# Patient Record
Sex: FEMALE | Race: WHITE | NOT HISPANIC OR LATINO | Employment: FULL TIME | URBAN - METROPOLITAN AREA
[De-identification: names, ages, dates, MRNs, and addresses within clinical notes are randomized per-mention and may not be internally consistent; named-entity substitution may affect disease eponyms.]

---

## 2020-09-04 ENCOUNTER — APPOINTMENT (OUTPATIENT)
Dept: LAB | Facility: CLINIC | Age: 45
End: 2020-09-04

## 2020-09-04 ENCOUNTER — OCCMED (OUTPATIENT)
Dept: URGENT CARE | Facility: CLINIC | Age: 45
End: 2020-09-04

## 2020-09-04 DIAGNOSIS — Z02.1 PHYSICAL EXAM, PRE-EMPLOYMENT: Primary | ICD-10-CM

## 2020-09-04 PROCEDURE — 36415 COLL VENOUS BLD VENIPUNCTURE: CPT

## 2020-09-04 PROCEDURE — 86480 TB TEST CELL IMMUN MEASURE: CPT

## 2020-09-08 LAB
GAMMA INTERFERON BACKGROUND BLD IA-ACNC: 0.06 IU/ML
M TB IFN-G BLD-IMP: NEGATIVE
M TB IFN-G CD4+ BCKGRND COR BLD-ACNC: 0.15 IU/ML
M TB IFN-G CD4+ BCKGRND COR BLD-ACNC: 0.31 IU/ML
MITOGEN IGNF BCKGRD COR BLD-ACNC: >10 IU/ML

## 2021-07-06 ENCOUNTER — HOSPITAL ENCOUNTER (EMERGENCY)
Facility: HOSPITAL | Age: 46
Discharge: HOME | End: 2021-07-06
Attending: EMERGENCY MEDICINE
Payer: OTHER MISCELLANEOUS

## 2021-07-06 ENCOUNTER — APPOINTMENT (EMERGENCY)
Dept: RADIOLOGY | Facility: HOSPITAL | Age: 46
End: 2021-07-06
Payer: OTHER MISCELLANEOUS

## 2021-07-06 VITALS
RESPIRATION RATE: 18 BRPM | TEMPERATURE: 98.1 F | DIASTOLIC BLOOD PRESSURE: 82 MMHG | SYSTOLIC BLOOD PRESSURE: 138 MMHG | WEIGHT: 107 LBS | BODY MASS INDEX: 21.57 KG/M2 | HEIGHT: 59 IN | HEART RATE: 79 BPM | OXYGEN SATURATION: 99 %

## 2021-07-06 DIAGNOSIS — S39.012A STRAIN OF LUMBAR REGION, INITIAL ENCOUNTER: Primary | ICD-10-CM

## 2021-07-06 PROCEDURE — 99283 EMERGENCY DEPT VISIT LOW MDM: CPT

## 2021-07-06 PROCEDURE — 99284 EMERGENCY DEPT VISIT MOD MDM: CPT | Performed by: PHYSICIAN ASSISTANT

## 2021-07-06 PROCEDURE — 72100 X-RAY EXAM L-S SPINE 2/3 VWS: CPT

## 2021-07-06 RX ORDER — PREDNISONE 20 MG/1
40 TABLET ORAL ONCE
Status: COMPLETED | OUTPATIENT
Start: 2021-07-06 | End: 2021-07-06

## 2021-07-06 RX ORDER — PREDNISONE 20 MG/1
40 TABLET ORAL DAILY
Qty: 8 TABLET | Refills: 0 | Status: SHIPPED | OUTPATIENT
Start: 2021-07-07 | End: 2021-07-11

## 2021-07-06 RX ADMIN — PREDNISONE 40 MG: 20 TABLET ORAL at 13:10

## 2021-07-06 NOTE — DISCHARGE INSTRUCTIONS
Take prednisone as prescribed  Take Tylenol 650mg and ibuprofen 600mg every 6 hours as needed for pain  Use lidocaine patches daily (12 hours on, 12 hours off)  Please call today to schedule a follow-up with occupational medicine  Return to the ER with any worsening symptoms

## 2021-07-06 NOTE — Clinical Note
Contreras Albarran was seen and treated in our emergency department on 7/6/2021  No work until cleared by Family Doctor/Orthopedics        Diagnosis: Lumbar muscle strain    Frannie    She may return on this date:     No work until cleared by occupational medicine  If you have any questions or concerns, please don't hesitate to call        Marvin Finnegan PA-C    ______________________________           _______________          _______________  Hospital Representative                              Date                                Time

## 2021-07-06 NOTE — ED PROVIDER NOTES
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[address]  She was holding a patient's leg last week and felt a strain in her low back  There was no direct trauma  Patient had to take a few days off of work last week due to continued pain  She was able to rest and went to two chiropractic appointments and was feeling improved  Patient returned to work today and her low back pain returned  She was referred to the ER by her employer  Patient has been taking naproxen once daily with some improvement  She is otherwise asymptomatic and denies any fevers, chills, abdominal pain, incontinence, saddle anesthesia  No weakness or paresthesias in her lower extremities  Patient has a remote history of herniated discs  No prior back surgeries  History provided by:  Patient   used: No    Back Pain  Location:  Lumbar spine  Quality:  Aching  Radiates to:  Does not radiate  Pain severity:  Moderate  Onset quality:  Gradual  Duration:  1 week  Timing:  Constant  Progression:  Unchanged  Chronicity:  New  Context: physical stress    Relieved by:  NSAIDs (Rest, chiropractic adjustment)  Worsened by:  Bending  Associated symptoms: no abdominal pain, no abdominal swelling, no bladder incontinence, no bowel incontinence, no dysuria, no fever, no leg pain, no numbness, no paresthesias, no perianal numbness, no tingling and no weakness        None       History reviewed  No pertinent past medical history  History reviewed  No pertinent surgical history  History reviewed  No pertinent family history  I have reviewed and agree with the history as documented      E-Cigarette/Vaping    E-Cigarette Use Never User      E-Cigarette/Vaping Substances     Social History     Tobacco Use    Smoking status: Never Smoker    Smokeless tobacco: Never Used   Vaping Use    Vaping Use: Never used   Substance Use Topics    Alcohol use: Never    Drug use: Never       Review of Systems   Constitutional: Negative for chills and fever  Eyes: Negative for discharge and redness  Respiratory: Negative for shortness of breath and stridor  Gastrointestinal: Negative for abdominal distention, abdominal pain and bowel incontinence  Genitourinary: Negative for bladder incontinence, difficulty urinating and dysuria  Musculoskeletal: Positive for back pain  Negative for neck pain and neck stiffness  Skin: Negative for color change and wound  Neurological: Negative for tingling, weakness, numbness and paresthesias  Psychiatric/Behavioral: Negative for confusion  The patient is not nervous/anxious  All other systems reviewed and are negative  Physical Exam  Physical Exam  Vitals and nursing note reviewed  Constitutional:       General: She is not in acute distress  Appearance: She is well-developed  She is not diaphoretic  Comments: Non-toxic appearing   HENT:      Head: Normocephalic and atraumatic  Right Ear: External ear normal       Left Ear: External ear normal       Nose: Nose normal    Eyes:      General: No scleral icterus  Right eye: No discharge  Left eye: No discharge  Conjunctiva/sclera: Conjunctivae normal    Cardiovascular:      Rate and Rhythm: Normal rate  Pulses:           Posterior tibial pulses are 2+ on the right side and 2+ on the left side  Pulmonary:      Effort: Pulmonary effort is normal  No respiratory distress  Breath sounds: No stridor  Musculoskeletal:         General: No deformity  Normal range of motion  Cervical back: Normal range of motion and neck supple  Lumbar back: Tenderness present  No edema, deformity, signs of trauma, lacerations or bony tenderness  Right lower leg: No edema  Left lower leg: No edema        Comments: +Tenderness at the left paravertebral musculature in the lumbar region  No midline tenderness or step-offs  No overlying skin changes  Lymphadenopathy:      Cervical: No cervical adenopathy  Skin:     General: Skin is warm and dry  Neurological:      General: No focal deficit present  Mental Status: She is alert  She is not disoriented  GCS: GCS eye subscore is 4  GCS verbal subscore is 5  GCS motor subscore is 6  Comments: 5/5 strength and light sensation intact in b/l lower extremities  Psychiatric:         Mood and Affect: Mood normal          Behavior: Behavior normal          Vital Signs  ED Triage Vitals [07/06/21 1219]   Temperature Pulse Respirations Blood Pressure SpO2   98 1 °F (36 7 °C) 79 18 138/82 99 %      Temp Source Heart Rate Source Patient Position - Orthostatic VS BP Location FiO2 (%)   Oral Monitor Sitting Right arm --      Pain Score       6           Vitals:    07/06/21 1219   BP: 138/82   Pulse: 79   Patient Position - Orthostatic VS: Sitting         Visual Acuity      ED Medications  Medications   predniSONE tablet 40 mg (40 mg Oral Given 7/6/21 1310)       Diagnostic Studies  Results Reviewed     None                 XR lumbar spine 2 or 3 views   ED Interpretation by Marissa Stinson PA-C (07/06 1247)   No acute fracture      Final Result by Natasha Ratliff MD (07/06 8951)      No acute osseous abnormality  Workstation performed: [id number]                    Procedures  Procedures         ED Course                     MDM  Number of Diagnoses or Management Options  Strain of lumbar region, initial encounter: new and requires workup  Diagnosis management comments: 38yo female presenting for left sided low back pain x 1 week  Pain started after lifting a patient's leg while at work  She was feeling improved but had to return to work today and the pain has returned  No paresthesias or weakness in the lower extremities   No red flags in history including no fever, saddle anesthesia, incontinence  Patient is afebrile and hemodynamically stable  She has muscular tenderness on exam  No midline bony tenderness  Lower extremities are neurovascularly intact  Lumbar x-rays obtained which are negative for fracture  Presentation is consistent with a muscular strain  Will start on a course of prednisone  Advised PRN Tylenol, Motrin, and lidoderm patches  Will refer to occupational medicine for f/u  ED return precautions discussed  Patient expressed understanding and is agreeable to plan  Patient discharged in stable condition  Amount and/or Complexity of Data Reviewed  Tests in the radiology section of CPT®: ordered and reviewed  Independent visualization of images, tracings, or specimens: yes    Risk of Complications, Morbidity, and/or Mortality  Presenting problems: low  Diagnostic procedures: low  Management options: low    Patient Progress  Patient progress: stable      Disposition  Final diagnoses:   Strain of lumbar region, initial encounter     Time reflects when diagnosis was documented in both MDM as applicable and the Disposition within this note     Time User Action Codes Description Comment    7/6/2021 12:54 PM [facility] Loco Alonso Strain of lumbar region, initial encounter       ED Disposition     ED Disposition Condition Date/Time Comment    Discharge Stable Tue Jul 6, 2021 12:54 PM Kiley Arrington discharge to home/self care              Follow-up Information     Follow up With Specialties Details Why Contact Info Additional Information    [address] [facility] Occupational Medicine Schedule an appointment as soon as possible for a visit   [address] [facility], [address] [place] [address] Emergency Department Emergency Medicine  If symptoms worsen [address]  [facility] [address] [facility] [id number] [facility] Emergency Department, [address] [facility], [place], [place], [address]          Discharge Medication List as of 7/6/2021 12:57 PM      START taking these medications    Details   predniSONE 20 mg tablet Take 2 tablets (40 mg total) by mouth daily for 4 days, Starting Wed 7/7/2021, Until Sun 7/11/2021, Normal           No discharge procedures on file      PDMP Review     None          ED Provider  Electronically Signed by           [id number] Lalo Ramsey PA-C  07/07/21 1037

## 2021-07-08 ENCOUNTER — OCCMED (OUTPATIENT)
Dept: URGENT CARE | Facility: CLINIC | Age: 46
End: 2021-07-08
Payer: OTHER MISCELLANEOUS

## 2021-07-08 DIAGNOSIS — S39.012A STRAIN OF MUSCLE, FASCIA AND TENDON OF LOWER BACK, INITIAL ENCOUNTER: Primary | ICD-10-CM

## 2021-07-08 PROCEDURE — 99213 OFFICE O/P EST LOW 20 MIN: CPT | Performed by: PHYSICIAN ASSISTANT

## 2021-07-08 RX ORDER — CYCLOBENZAPRINE HCL 10 MG
10 TABLET ORAL 3 TIMES DAILY PRN
Qty: 21 TABLET | Refills: 0 | Status: SHIPPED | OUTPATIENT
Start: 2021-07-08 | End: 2021-07-15

## 2021-07-09 ENCOUNTER — APPOINTMENT (OUTPATIENT)
Dept: URGENT CARE | Facility: CLINIC | Age: 46
End: 2021-07-09
Payer: OTHER MISCELLANEOUS

## 2021-07-09 PROCEDURE — 99214 OFFICE O/P EST MOD 30 MIN: CPT

## 2021-07-22 ENCOUNTER — OFFICE VISIT (OUTPATIENT)
Dept: OBGYN CLINIC | Facility: CLINIC | Age: 46
End: 2021-07-22
Payer: OTHER MISCELLANEOUS

## 2021-07-22 VITALS
HEIGHT: 59 IN | WEIGHT: 108 LBS | SYSTOLIC BLOOD PRESSURE: 125 MMHG | HEART RATE: 79 BPM | BODY MASS INDEX: 21.77 KG/M2 | DIASTOLIC BLOOD PRESSURE: 90 MMHG

## 2021-07-22 DIAGNOSIS — S39.012A LUMBAR STRAIN, INITIAL ENCOUNTER: Primary | ICD-10-CM

## 2021-07-22 DIAGNOSIS — M62.830 LUMBAR PARASPINAL MUSCLE SPASM: ICD-10-CM

## 2021-07-22 PROCEDURE — 99204 OFFICE O/P NEW MOD 45 MIN: CPT | Performed by: FAMILY MEDICINE

## 2021-07-22 NOTE — PROGRESS NOTES
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  Discussed with patient physical exam, radiographs, impression and plan  X-rays of lumbar spine are unremarkable for osseous abnormality  Physical exam noted for midline tenderness L4-S1 and right lumbar paraspinal and right sacroiliac joint tenderness  She has limited motion with forward flexion and extension of low back  She has intact strength, sensation, and patellar reflex both lower extremities  Clinical impression that she is symptomatic from acute lumbar strain, however exacerbation of underlying lumbar spine pathology is not excluded  I discussed treatment regimen of rest, supplements, topical anesthetic, and physical therapy  She may continue work with 30 lb restriction  She is to start taking tumeric 500 mg twice daily and tart cherry at least 1000 mg daily  She may apply topical diclofenac gel 3 to 4 times a day and she may also apply topical CBD  She is to start physical therapy as soon as possible and do home exercises as directed  She will follow up in 5 weeks at which point she will be re-evaluated  Subjective:   Patient ID: Gus Tamayo is a 40 y o  female  Chief Complaint   Patient presents with    Lower Back - Pain       64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  She states that she was assisting during the surgery with holding a patient's leg above shoulder level for prolonged amount of time    She had pain described as sudden in onset localized to the lumbosacral aspect spine, aching and sore and burning, nonradiating, worse with bending and twisting, and improved with resting  At home she rested, applied heat and ice, and also took Aleve  The next day she woke up and had difficulty with moving  She saw chiropractor and had received electrical stimulation  Her range of motion improved slightly  She tried to manage symptoms on her own however they persisted  He presented to emergency room at which point she was prescribed course of oral steroid and advised to follow-up with occupational medicine  She was seen by occupational medicine and prescribed cyclobenzaprine  She states that symptoms have been improving, but not resolved  She has had difficulty with heavy lifting, pushing, pulling and with bending and twisting  She also states that pain is worsened with prolonged standing or prolonged sitting, and she has to frequently change positions  She denies numbness or tingling in lower extremities or any bowel or bladder incontinence  Back Pain  This is a new problem  The current episode started 1 to 4 weeks ago  The problem occurs constantly  The problem has been gradually improving  Pertinent negatives include no abdominal pain, arthralgias, chest pain, chills, fever, joint swelling, numbness, rash, sore throat or weakness  The symptoms are aggravated by bending and twisting  She has tried position changes, NSAIDs, ice and heat (Oral steroid, chiropractic) for the symptoms  The treatment provided mild relief  The following portions of the patient's history were reviewed and updated as appropriate: She  has no past medical history on file  She  has a past surgical history that includes Appendectomy (1990)  Her family history includes No Known Problems in her father and mother  She  reports that she has never smoked  She has never used smokeless tobacco  She reports that she does not drink alcohol and does not use drugs  She is allergic to kiwi extract - food allergy       Review of Systems   Constitutional: Negative for chills and fever     HENT: Negative for sore throat  Eyes: Negative for visual disturbance  Respiratory: Negative for shortness of breath  Cardiovascular: Negative for chest pain  Gastrointestinal: Negative for abdominal pain  Genitourinary: Negative for flank pain  Musculoskeletal: Positive for back pain  Negative for arthralgias and joint swelling  Skin: Negative for rash and wound  Neurological: Negative for weakness and numbness  Hematological: Does not bruise/bleed easily  Psychiatric/Behavioral: Negative for self-injury  Objective:  Vitals:    07/22/21 0812   BP: 125/90   Pulse: 79   Weight: 49 kg (108 lb)   Height: 4' 11" (1 499 m)     Right Ankle Exam     Muscle Strength   Dorsiflexion:  5/5  Plantar flexion:  5/5      Left Ankle Exam     Muscle Strength   Dorsiflexion:  5/5   Plantar flexion:  5/5       Right Hip Exam     Muscle Strength   Flexion: 5/5     Tests   ABDIEL: negative    Comments:  Negative FADDIR      Left Hip Exam     Muscle Strength   Flexion: 5/5     Tests   ABDIEL: negative    Comments:  Negative FADDIR      Back Exam     Tenderness   The patient is experiencing tenderness in the lumbar and sacroiliac (Midline tenderness L4-S1, right lumbar paraspinal and right sacroiliac joint tenderness)  Range of Motion   Extension: abnormal   Flexion: abnormal   Lateral bend right: normal   Lateral bend left: normal   Rotation right: normal   Rotation left: normal     Muscle Strength   Right Quadriceps:  5/5   Left Quadriceps:  5/5     Tests   Straight leg raise right: negative  Straight leg raise left: negative    Reflexes   Patellar: normal    Other   Sensation: normal  Gait: normal           Strength/Myotome Testing     Left Ankle/Foot   Dorsiflexion: 5  Plantar flexion: 5  Great toe extension: 5    Right Ankle/Foot   Dorsiflexion: 5  Plantar flexion: 5  Great toe extension: 5      Physical Exam  Vitals and nursing note reviewed  Constitutional:       General: She is not in acute distress  Appearance: She is well-developed  She is not ill-appearing or diaphoretic  HENT:      Head: Normocephalic  Right Ear: External ear normal       Left Ear: External ear normal    Eyes:      Conjunctiva/sclera: Conjunctivae normal    Neck:      Trachea: No tracheal deviation  Cardiovascular:      Rate and Rhythm: Normal rate  Pulmonary:      Effort: Pulmonary effort is normal  No respiratory distress  Abdominal:      General: There is no distension  Musculoskeletal:         General: Tenderness present  No swelling, deformity or signs of injury  Lumbar back: Negative right straight leg raise test and negative left straight leg raise test       Right lower leg: No edema  Left lower leg: No edema  Skin:     General: Skin is warm and dry  Coloration: Skin is not jaundiced or pale  Neurological:      Mental Status: She is alert and oriented to person, place, and time  Psychiatric:         Mood and Affect: Mood normal          Behavior: Behavior normal          Thought Content: Thought content normal          Judgment: Judgment normal          I have personally reviewed pertinent films in PACS and my interpretation is No acute osseous abnormality of lumbar spine

## 2021-07-22 NOTE — LETTER
July 22, 2021     Patient: Mikle Cockayne   YOB: 1975   Date of Visit: 7/22/2021       To Whom it May Concern:    Mikle Cockayne is under my professional care  She was seen in my office on 7/22/2021  She may work with restrictions:  -No lifting, pushing, or pulling more than 30 pounds  -No lifting above shoulder level    She will be re-evaluated in 5 weeks  If you have any questions or concerns, please don't hesitate to call           Sincerely,          [facility] [employer], DO        CC: No Recipients

## 2021-07-23 ENCOUNTER — APPOINTMENT (OUTPATIENT)
Dept: URGENT CARE | Facility: CLINIC | Age: 46
End: 2021-07-23
Payer: OTHER MISCELLANEOUS

## 2021-07-23 PROCEDURE — 99213 OFFICE O/P EST LOW 20 MIN: CPT

## 2025-07-24 ENCOUNTER — HOSPITAL ENCOUNTER (OUTPATIENT)
Dept: ULTRASOUND IMAGING | Facility: CLINIC | Age: 50
End: 2025-07-24
Attending: PHYSICIAN ASSISTANT
Payer: COMMERCIAL

## 2025-07-24 DIAGNOSIS — R92.30 DENSE BREASTS: ICD-10-CM

## 2025-07-24 PROCEDURE — 76641 ULTRASOUND BREAST COMPLETE: CPT
